# Patient Record
Sex: FEMALE | Race: WHITE | NOT HISPANIC OR LATINO | Employment: STUDENT | URBAN - METROPOLITAN AREA
[De-identification: names, ages, dates, MRNs, and addresses within clinical notes are randomized per-mention and may not be internally consistent; named-entity substitution may affect disease eponyms.]

---
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
## 2022-03-03 RX ORDER — LORATADINE 10 MG/1
1 CAPSULE, LIQUID FILLED ORAL DAILY
COMMUNITY

## 2022-03-03 RX ORDER — DOXYCYCLINE HYCLATE 100 MG/1
1 CAPSULE ORAL 2 TIMES DAILY
COMMUNITY
End: 2022-03-04

## 2022-03-03 RX ORDER — LEVOTHYROXINE SODIUM 0.05 MG/1
1 TABLET ORAL DAILY
COMMUNITY
End: 2022-03-04

## 2022-03-04 ENCOUNTER — ANNUAL EXAM (OUTPATIENT)
Dept: OBGYN CLINIC | Facility: CLINIC | Age: 22
End: 2022-03-04
Payer: COMMERCIAL

## 2022-03-04 VITALS
SYSTOLIC BLOOD PRESSURE: 100 MMHG | DIASTOLIC BLOOD PRESSURE: 60 MMHG | HEIGHT: 67 IN | BODY MASS INDEX: 19.62 KG/M2 | WEIGHT: 125 LBS

## 2022-03-04 DIAGNOSIS — Z01.419 ENCOUNTER FOR GYNECOLOGICAL EXAMINATION WITHOUT ABNORMAL FINDING: Primary | ICD-10-CM

## 2022-03-04 DIAGNOSIS — Z30.41 SURVEILLANCE FOR BIRTH CONTROL, ORAL CONTRACEPTIVES: ICD-10-CM

## 2022-03-04 PROCEDURE — S0612 ANNUAL GYNECOLOGICAL EXAMINA: HCPCS | Performed by: OBSTETRICS & GYNECOLOGY

## 2022-03-04 RX ORDER — DROSPIRENONE AND ETHINYL ESTRADIOL 0.02-3(28)
1 KIT ORAL DAILY
Qty: 90 TABLET | Refills: 4 | Status: SHIPPED | OUTPATIENT
Start: 2022-03-04 | End: 2023-03-04

## 2022-03-04 RX ORDER — LEVOTHYROXINE SODIUM 0.12 MG/1
0.5 TABLET ORAL DAILY
COMMUNITY
Start: 2022-03-02

## 2022-03-04 RX ORDER — SERTRALINE HYDROCHLORIDE 25 MG/1
1 TABLET, FILM COATED ORAL DAILY
COMMUNITY
Start: 2022-03-02

## 2022-03-04 NOTE — PATIENT INSTRUCTIONS
Pap every 3 years if normal, STI testing as indicated, exercise most days of week, obtain appropriate diet and hydration, Calcium 1000mg + 600 vit D daily, birth control as directed (ACHES reviewed)  Benefits, risks and alternatives discussed/reviewed  Condom use when sexually active for sexually transmitted infection prevention  HPV 9 vaccine recommended through age 39  Check with your insurance for coverage  If covered, call office to schedule start of vaccine series  Annual mammogram starting at age 36, monthly breast self exam  Tisha Venegas   at red light or at least  20 times a day

## 2022-03-04 NOTE — PROGRESS NOTES
[address], [address], [facility], [address] [facility]    ASSESSMENT/PLAN: Myla Durham is a 25 y o  Francois Parsons who presents for annual gynecologic exam     Encounter for routine gynecologic examination  - Routine well woman exam completed today  - HPV Vaccination status: Immunization series complete   -COVID maderna  X 3  Flu shot   Neg    - STI screening offered including HIV testing:  Na    - Contraceptive counseling discussed  Current contraception: condoms or combination OCPs:     Additional problems addressed during this visit:  1  Encounter for gynecological examination without abnormal finding    2  Surveillance for birth control, oral contraceptives  -     drospirenone-ethinyl estradiol (ROXANN) 3-0 02 MG per tablet; Take 1 tablet by mouth daily      24-year-old  0 para 0 here for wellness exam   Never sexually active  Positive on birth control pills for cycle regulation  Denies aches and breakthrough bleeding  Encouraged condom use if sexually active  CC:  Annual Gynecologic Examination    HPI: Myla Durham is a 25 y o  Francois Parsons who presents for annual gynecologic examination  24-year-old  0 para 0 here for wellness exam   Menarche at 17years of age cycles every 29-35 days, irregular but predictable, mild-to-moderate cramps changes a tampon every 4 hours Gardasil complete  Past medical history headaches no aura, hypothyroidism, and seasonal allergies  Surgical history hernia repair in  and appendectomy in   No family history of breast uterine ovarian or colon cancer  Nonsmoker on yes encouraged condoms if sexually active      The following portions of the patient's history were reviewed and updated as appropriate: She  has a past medical history of Hypothyroid  She  has a past surgical history that includes Hernia repair and Appendectomy  Her family history includes Migraines in her mother  She  reports that she has never smoked   She has never used smokeless tobacco  She reports current alcohol use  She reports that she does not use drugs  Current Outpatient Medications   Medication Sig Dispense Refill    drospirenone-ethinyl estradiol (ROXANN) 3-0 02 MG per tablet Take 1 tablet by mouth daily 90 tablet 4    levothyroxine 125 mcg tablet Take 0 5 tablets by mouth daily      Loratadine 10 MG CAPS Take 1 capsule by mouth daily      sertraline (ZOLOFT) 25 mg tablet Take 1 tablet by mouth daily       No current facility-administered medications for this visit  She has No Known Allergies       Review of Systems   Constitutional: Negative for chills and fever  HENT: Negative for ear pain and sore throat  Eyes: Negative for pain and visual disturbance  Respiratory: Negative for cough and shortness of breath  Cardiovascular: Negative for chest pain and palpitations  Gastrointestinal: Negative for abdominal pain and vomiting  Genitourinary: Negative for dysuria and hematuria  Musculoskeletal: Negative for arthralgias and back pain  Skin: Negative for color change and rash  Neurological: Negative for seizures and syncope  Hematological: Negative  Psychiatric/Behavioral: Negative  All other systems reviewed and are negative  Objective:  /60 (BP Location: Left arm, Patient Position: Sitting, Cuff Size: Standard)   Ht 5' 7 25" (1 708 m)   Wt 56 7 kg (125 lb)   LMP 02/22/2022 (Exact Date)   Breastfeeding No   BMI 19 43 kg/m²    Physical Exam  Vitals and nursing note reviewed  Constitutional:       Appearance: Normal appearance  HENT:      Head: Normocephalic  Cardiovascular:      Rate and Rhythm: Normal rate and regular rhythm  Pulses: Normal pulses  Heart sounds: Normal heart sounds  Pulmonary:      Effort: Pulmonary effort is normal       Breath sounds: Normal breath sounds  Chest:      Chest wall: No mass, lacerations, swelling, tenderness or edema  Breasts: Brian Score is 4   Breasts are symmetrical       Right: Normal  No swelling, bleeding, inverted nipple, mass, nipple discharge, skin change, tenderness, axillary adenopathy or supraclavicular adenopathy  Left: No swelling, bleeding, inverted nipple, mass, nipple discharge, skin change, tenderness, axillary adenopathy or supraclavicular adenopathy  Abdominal:      General: Abdomen is flat  Bowel sounds are normal       Palpations: Abdomen is soft  Genitourinary:     Brian stage (genital): 5  Comments: Pelvic deferred  Never sexually active   No vaginal complaints     Musculoskeletal:         General: Normal range of motion  Cervical back: Neck supple  Lymphadenopathy:      Upper Body:      Right upper body: No supraclavicular, axillary or pectoral adenopathy  Left upper body: No supraclavicular, axillary or pectoral adenopathy  Skin:     General: Skin is warm and dry  Neurological:      General: No focal deficit present  Mental Status: She is alert and oriented to person, place, and time     Psychiatric:         Mood and Affect: Mood normal          Behavior: Behavior normal

## 2022-04-29 ENCOUNTER — TELEPHONE (OUTPATIENT)
Dept: OBGYN CLINIC | Facility: CLINIC | Age: 22
End: 2022-04-29

## 2022-04-29 NOTE — TELEPHONE ENCOUNTER
Pt called informing she has been on the same OCP for 2 years and is having BTB concerns  Pt denies missing any pills but admits to not talking her pills consistently same time everyday, pt states, " I need to get better at that "  Pt is sexually active and reports she uses back up protection  Advised some birth controls are time sensitive and should be taken with in an hour of a scheduled time daily, not taking consistently daily can cause BTB, continue to monitor, if BTB continues after taking daily at the same time, [place] BTB days on pill pack and schedule an OV and  check a HPT  Pt reports she just had a normal period last week     Pt also c/o vaginal itching, no odor,admits to treating for yeast infections in the last "month or so"  but not finishing full 7 day course, states, " I use Monistat hear and there "  Recommended to complete a full 7 day course of Monistat, open to air, allow pelvic rest, if symptoms persist, develops abnormal vaginal discharge or odor, schedule a pelvic exam

## 2023-03-16 DIAGNOSIS — Z30.41 SURVEILLANCE FOR BIRTH CONTROL, ORAL CONTRACEPTIVES: ICD-10-CM

## 2023-03-16 RX ORDER — DROSPIRENONE AND ETHINYL ESTRADIOL 0.02-3(28)
KIT ORAL
Qty: 84 TABLET | OUTPATIENT
Start: 2023-03-16

## 2023-03-16 RX ORDER — DROSPIRENONE AND ETHINYL ESTRADIOL 0.02-3(28)
1 KIT ORAL DAILY
Qty: 90 TABLET | Refills: 0 | Status: SHIPPED | OUTPATIENT
Start: 2023-03-16 | End: 2023-03-16 | Stop reason: SDUPTHER

## 2023-03-16 RX ORDER — DROSPIRENONE AND ETHINYL ESTRADIOL 0.02-3(28)
1 KIT ORAL DAILY
Qty: 90 TABLET | Refills: 0 | Status: SHIPPED | OUTPATIENT
Start: 2023-03-16 | End: 2024-03-15

## 2023-03-30 ENCOUNTER — PATIENT MESSAGE (OUTPATIENT)
Dept: OBGYN CLINIC | Facility: CLINIC | Age: 23
End: 2023-03-30

## 2023-03-30 DIAGNOSIS — Z30.41 SURVEILLANCE FOR BIRTH CONTROL, ORAL CONTRACEPTIVES: Primary | ICD-10-CM

## 2023-03-30 RX ORDER — NORGESTREL-ETHINYL ESTRADIOL 0.3-0.03MG
1 TABLET ORAL DAILY
Qty: 90 TABLET | Refills: 2 | Status: SHIPPED | OUTPATIENT
Start: 2023-03-30 | End: 2023-09-26

## 2023-08-15 ENCOUNTER — ANNUAL EXAM (OUTPATIENT)
Dept: OBGYN CLINIC | Facility: CLINIC | Age: 23
End: 2023-08-15
Payer: COMMERCIAL

## 2023-08-15 VITALS
WEIGHT: 143.2 LBS | DIASTOLIC BLOOD PRESSURE: 74 MMHG | HEIGHT: 68 IN | SYSTOLIC BLOOD PRESSURE: 110 MMHG | BODY MASS INDEX: 21.7 KG/M2

## 2023-08-15 DIAGNOSIS — Z30.41 SURVEILLANCE FOR BIRTH CONTROL, ORAL CONTRACEPTIVES: ICD-10-CM

## 2023-08-15 DIAGNOSIS — Z01.419 GYNECOLOGIC EXAM NORMAL: Primary | ICD-10-CM

## 2023-08-15 DIAGNOSIS — R10.2 PELVIC PAIN: ICD-10-CM

## 2023-08-15 PROBLEM — E06.3 AUTOIMMUNE HYPOTHYROIDISM: Status: ACTIVE | Noted: 2018-09-10

## 2023-08-15 PROBLEM — E06.3 HASHIMOTO'S THYROIDITIS: Status: ACTIVE | Noted: 2018-09-10

## 2023-08-15 PROCEDURE — G0145 SCR C/V CYTO,THINLAYER,RESCR: HCPCS | Performed by: PHYSICIAN ASSISTANT

## 2023-08-15 PROCEDURE — S0612 ANNUAL GYNECOLOGICAL EXAMINA: HCPCS | Performed by: PHYSICIAN ASSISTANT

## 2023-08-15 RX ORDER — NORGESTREL-ETHINYL ESTRADIOL 0.3-0.03MG
1 TABLET ORAL DAILY
Qty: 84 TABLET | Refills: 4 | Status: SHIPPED | OUTPATIENT
Start: 2023-08-15 | End: 2024-02-11

## 2023-08-15 RX ORDER — LEVOTHYROXINE SODIUM 0.07 MG/1
TABLET ORAL
COMMUNITY
Start: 2022-10-01

## 2023-08-15 NOTE — PROGRESS NOTES
Assessment/Plan   Problem List Items Addressed This Visit        Other    Gynecologic exam normal - Primary     Pap guidelines reviewed. Pap with reflex done today. With new onset pelvic pain outside of menses in the last few months recommend pelvic ultrasound to further evaluate. Office will call with results and appropriate follow up. Reviewed options for heavy, painful bleeding. Reviewed option of switching back to Mis vs Melany to see if helps. Patient would like to continue Cryselle for now and will decide further once pelvic ultrasound results come back. Return to office for annual or as needed. Relevant Orders    Liquid-based pap, screening   Other Visit Diagnoses     Pelvic pain        Relevant Orders    US pelvis complete w transvaginal    Surveillance for birth control, oral contraceptives        Relevant Medications    norgestrel-ethinyl estradiol (Cryselle-28) 0.3 mg-30 mcg per tablet          Subjective:     Patient ID: Oxana Hawkins is a 26 y.o. y.o. female. HPI  27 yo seen for annual exam. Previously on Mis OCP, was having a lot of breakthrough bleeding. Switched to Cryselle OCP in 3/2023. Reports cramping in lower back prior to menses. Also reports menses have been heavier. Reports is having pain randomly not only with menses. Noticing more recently on left side but does occur on right. Denies bowel or bladder issues. Currently uses pads or tampons. Changing super tampon every 3 hours on the heaviest day. Heavy for the first 4 days or so. Denies bowel or bladder issues. Denies STD concerns. Last pap: has not had done yet. The following portions of the patient's history were reviewed and updated as appropriate:   She  has a past medical history of Anemia, Hypothyroid, and Migraine.   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 Her family history includes Migraines in her mother. She  reports that she has never smoked. She has never used smokeless tobacco. She reports current alcohol use. She reports that she does not use drugs. Current Outpatient Medications   Medication Sig Dispense Refill   • levothyroxine 75 mcg tablet      • Loratadine 10 MG CAPS Take 1 capsule by mouth daily     • norgestrel-ethinyl estradiol (Cryselle-28) 0.3 mg-30 mcg per tablet Take 1 tablet by mouth daily 84 tablet 4   • sertraline (ZOLOFT) 25 mg tablet Take 1 tablet by mouth daily       No current facility-administered medications for this visit. She has No Known Allergies. .    Menstrual History:  OB History        0    Para   0    Term   0       0    AB   0    Living   0       SAB   0    IAB   0    Ectopic   0    Multiple   0    Live Births   0           Obstetric Comments   Menarche: 14                Patient's last menstrual period was 2023 (approximate). Review of Systems   Constitutional: Negative for fatigue, fever and unexpected weight change. HENT: Negative for dental problem and sinus pressure. Eyes: Negative for visual disturbance. Respiratory: Negative for cough, shortness of breath and wheezing. Cardiovascular: Negative for chest pain. Gastrointestinal: Negative for abdominal pain, blood in stool, constipation, diarrhea, nausea and vomiting. Endocrine: Negative for polydipsia. Genitourinary: Positive for pelvic pain. Negative for difficulty urinating, dyspareunia, dysuria, frequency, hematuria and urgency. Musculoskeletal: Negative for arthralgias and back pain. Neurological: Negative for dizziness, seizures, light-headedness and headaches. Psychiatric/Behavioral: Negative for suicidal ideas.  The patient is not nervous/anxious. Objective:  Vitals:    08/15/23 1112   BP: 110/74   BP Location: Left arm   Patient Position: Sitting   Cuff Size: Standard   Weight: 65 kg (143 lb 3.2 oz)   Height: 5' 8" (1.727 m)      Physical Exam  Constitutional:       Appearance: Normal appearance. She is well-developed. Genitourinary:      Vulva and bladder normal.      No lesions in the vagina. Right Labia: No rash, tenderness, lesions or skin changes. Left Labia: No tenderness, lesions, skin changes or rash. No labial fusion noted. No inguinal adenopathy present in the right or left side. No vaginal discharge, erythema, tenderness or bleeding. Right Adnexa: not tender, not full and no mass present. Left Adnexa: not tender, not full and no mass present. No cervical motion tenderness, discharge or lesion. Uterus is not enlarged, tender or irregular. No uterine mass detected. No urethral prolapse, tenderness or mass present. Bladder is not tender. Breasts:     Breasts are symmetrical.      Right: No swelling, bleeding, inverted nipple, mass, nipple discharge, skin change or tenderness. Left: No swelling, bleeding, inverted nipple, mass, nipple discharge, skin change or tenderness. HENT:      Head: Normocephalic and atraumatic. Neck:      Thyroid: No thyromegaly. Cardiovascular:      Rate and Rhythm: Normal rate and regular rhythm. Heart sounds: Normal heart sounds. No murmur heard. No friction rub. No gallop. Pulmonary:      Effort: Pulmonary effort is normal. No respiratory distress. Breath sounds: Normal breath sounds. No wheezing. Abdominal:      General: There is no distension. Palpations: Abdomen is soft. There is no mass. Tenderness: There is no abdominal tenderness. There is no guarding or rebound. Hernia: No hernia is present. Lymphadenopathy:      Cervical: No cervical adenopathy.       Upper Body:      Right upper body: No supraclavicular, axillary or pectoral adenopathy. Left upper body: No supraclavicular, axillary or pectoral adenopathy. Lower Body: No right inguinal adenopathy. No left inguinal adenopathy. Neurological:      Mental Status: She is alert and oriented to person, place, and time. Skin:     General: Skin is warm and dry.    Psychiatric:         Behavior: Behavior normal.

## 2023-08-15 NOTE — ASSESSMENT & PLAN NOTE
Pap guidelines reviewed. Pap with reflex done today. With new onset pelvic pain outside of menses in the last few months recommend pelvic ultrasound to further evaluate. Office will call with results and appropriate follow up. Reviewed options for heavy, painful bleeding. Reviewed option of switching back to Mis vs Melany to see if helps. Patient would like to continue Cryselle for now and will decide further once pelvic ultrasound results come back. Return to office for annual or as needed.

## 2023-08-22 LAB
LAB AP GYN PRIMARY INTERPRETATION: NORMAL
LAB AP LMP: NORMAL
Lab: NORMAL

## 2023-10-14 PROBLEM — Z01.419 GYNECOLOGIC EXAM NORMAL: Status: RESOLVED | Noted: 2023-08-15 | Resolved: 2023-10-14

## 2024-02-29 DIAGNOSIS — L70.0 ACNE VULGARIS: Primary | ICD-10-CM

## 2024-02-29 RX ORDER — DROSPIRENONE AND ETHINYL ESTRADIOL 0.02-3(28)
1 KIT ORAL DAILY
Qty: 84 TABLET | Refills: 2 | Status: SHIPPED | OUTPATIENT
Start: 2024-02-29

## 2024-07-30 ENCOUNTER — PATIENT MESSAGE (OUTPATIENT)
Dept: OBGYN CLINIC | Facility: CLINIC | Age: 24
End: 2024-07-30

## 2024-07-30 DIAGNOSIS — L70.0 ACNE VULGARIS: ICD-10-CM

## 2024-07-31 RX ORDER — DROSPIRENONE AND ETHINYL ESTRADIOL 0.02-3(28)
1 KIT ORAL DAILY
Qty: 84 TABLET | Refills: 0 | Status: SHIPPED | OUTPATIENT
Start: 2024-07-31

## 2024-08-23 ENCOUNTER — ANNUAL EXAM (OUTPATIENT)
Dept: OBGYN CLINIC | Facility: CLINIC | Age: 24
End: 2024-08-23
Payer: COMMERCIAL

## 2024-08-23 VITALS
SYSTOLIC BLOOD PRESSURE: 108 MMHG | DIASTOLIC BLOOD PRESSURE: 72 MMHG | BODY MASS INDEX: 21.07 KG/M2 | HEIGHT: 68 IN | WEIGHT: 139 LBS

## 2024-08-23 DIAGNOSIS — E06.3 AUTOIMMUNE HYPOTHYROIDISM: ICD-10-CM

## 2024-08-23 DIAGNOSIS — Z11.3 SCREEN FOR STD (SEXUALLY TRANSMITTED DISEASE): ICD-10-CM

## 2024-08-23 DIAGNOSIS — Z01.419 ENCOUNTER FOR GYNECOLOGICAL EXAMINATION WITHOUT ABNORMAL FINDING: Primary | ICD-10-CM

## 2024-08-23 DIAGNOSIS — Z30.41 SURVEILLANCE FOR BIRTH CONTROL, ORAL CONTRACEPTIVES: ICD-10-CM

## 2024-08-23 DIAGNOSIS — L70.0 ACNE VULGARIS: ICD-10-CM

## 2024-08-23 PROBLEM — Z12.4 SCREENING FOR CERVICAL CANCER: Status: ACTIVE | Noted: 2024-08-23

## 2024-08-23 PROCEDURE — S0612 ANNUAL GYNECOLOGICAL EXAMINA: HCPCS | Performed by: OBSTETRICS & GYNECOLOGY

## 2024-08-23 RX ORDER — DROSPIRENONE AND ETHINYL ESTRADIOL 0.02-3(28)
1 KIT ORAL DAILY
Qty: 84 TABLET | Refills: 4 | Status: SHIPPED | OUTPATIENT
Start: 2024-08-23

## 2024-08-23 RX ORDER — FAMOTIDINE 10 MG
10 TABLET ORAL DAILY
COMMUNITY

## 2024-08-23 NOTE — PROGRESS NOTES
[facility] - [address], [address], [place] [address]    ASSESSMENT/PLAN: Kenia Johnson is a 24 y.o.  who presents for annual gynecologic exam.    Encounter for routine gynecologic examination  - Routine well woman exam completed today.  - HPV Vaccination status: Immunization series complete  - STI screening offered including HIV testing: Declined  - Contraceptive counseling discussed.  Current contraception: condoms or combination OCPs:     Additional problems addressed during this visit:  1. Encounter for gynecological examination without abnormal finding  2. Acne vulgaris  -     drospirenone-ethinyl estradiol (ROXANN) 3-0.02 MG per tablet; Take 1 tablet by mouth daily  3. Autoimmune hypothyroidism  4. Screen for STD (sexually transmitted disease)  -     Chlamydia/GC HARMONY, Confirmation  5. Surveillance for birth control, oral contraceptives      CC:  Annual Gynecologic Examination    HPI: Kenia Johnson is a 24 y.o.  who presents for annual gynecologic examination.  HPI    The following portions of the patient's history were reviewed and updated as appropriate: She  has a past medical history of Anemia, Hypothyroid, and Migraine.  She  has a past surgical history that includes Hernia repair; Appendectomy; and BOTOX INJECTION.  Her family history includes Migraines in her mother.  She  reports that she has never smoked. She has never used smokeless tobacco. She reports current alcohol use. She reports that she does not use drugs.  Current Outpatient Medications   Medication Sig Dispense Refill   • drospirenone-ethinyl estradiol (ROXANN) 3-0.02 MG per tablet Take 1 tablet by mouth daily 84 tablet 4   • famotidine (PEPCID) 10 mg tablet Take 10 mg by mouth in the morning     • levothyroxine 75 mcg tablet      • sertraline (ZOLOFT) 25 mg tablet Take 1 tablet by mouth daily       No current facility-administered medications for this visit.     She has No Known Allergies..    Review of  "Systems   Constitutional:  Negative for chills and fever.   HENT:  Negative for ear pain and sore throat.    Eyes: Negative.  Negative for pain and visual disturbance.   Respiratory:  Negative for cough and shortness of breath.    Cardiovascular:  Negative for chest pain and palpitations.   Gastrointestinal:  Negative for abdominal pain and vomiting.   Endocrine: Negative.    Genitourinary: Negative.  Negative for dysuria and hematuria.   Musculoskeletal:  Negative for arthralgias and back pain.   Skin:  Negative for color change and rash.   Allergic/Immunologic: Negative.    Neurological: Negative.  Negative for seizures and syncope.   Hematological: Negative.    Psychiatric/Behavioral: Negative.     All other systems reviewed and are negative.        Objective:  /72 (BP Location: Left arm, Patient Position: Sitting, Cuff Size: Large)   Ht 5' 8\" (1.727 m)   Wt 63 kg (139 lb)   LMP 08/13/2024 (Approximate)   BMI 21.13 kg/m²    Physical Exam  Vitals and nursing note reviewed.   Constitutional:       Appearance: Normal appearance.   HENT:      Head: Normocephalic.   Cardiovascular:      Rate and Rhythm: Normal rate and regular rhythm.      Pulses: Normal pulses.      Heart sounds: Normal heart sounds.   Pulmonary:      Effort: Pulmonary effort is normal.      Breath sounds: Normal breath sounds.   Chest:      Chest wall: No mass, lacerations, swelling, tenderness or edema.   Breasts:     Brian Score is 4.      Breasts are symmetrical.      Right: Normal. No swelling, bleeding, inverted nipple, mass, nipple discharge, skin change or tenderness.      Left: No swelling, bleeding, inverted nipple, mass, nipple discharge, skin change or tenderness.   Abdominal:      General: Abdomen is flat. Bowel sounds are normal.      Palpations: Abdomen is soft.   Genitourinary:     General: Normal vulva.      Exam position: Lithotomy position.      Pubic Area: No rash.       Brian stage (genital): 4.      Labia:         " Right: No rash, tenderness or lesion.         Left: No rash, tenderness or lesion.       Urethra: No urethral pain, urethral swelling or urethral lesion.      Vagina: Normal.      Cervix: No cervical motion tenderness or discharge.      Uterus: Normal.       Adnexa: Right adnexa normal and left adnexa normal.      Rectum: Normal.   Musculoskeletal:         General: Normal range of motion.      Cervical back: Normal range of motion and neck supple.   Lymphadenopathy:      Upper Body:      Right upper body: No supraclavicular, axillary or pectoral adenopathy.      Left upper body: No supraclavicular, axillary or pectoral adenopathy.      Lower Body: No right inguinal adenopathy. No left inguinal adenopathy.   Skin:     General: Skin is warm and dry.   Neurological:      General: No focal deficit present.      Mental Status: She is alert and oriented to person, place, and time.   Psychiatric:         Mood and Affect: Mood normal.         Behavior: Behavior normal.         Thought Content: Thought content normal.         Judgment: Judgment normal.

## 2024-09-22 PROBLEM — Z12.4 SCREENING FOR CERVICAL CANCER: Status: RESOLVED | Noted: 2024-08-23 | Resolved: 2024-09-22

## 2024-09-22 PROBLEM — Z11.3 SCREEN FOR STD (SEXUALLY TRANSMITTED DISEASE): Status: RESOLVED | Noted: 2024-08-23 | Resolved: 2024-09-22

## 2024-09-22 PROBLEM — Z01.419 ENCOUNTER FOR GYNECOLOGICAL EXAMINATION WITHOUT ABNORMAL FINDING: Status: RESOLVED | Noted: 2024-08-23 | Resolved: 2024-09-22

## 2024-09-27 ENCOUNTER — NURSE TRIAGE (OUTPATIENT)
Age: 24
End: 2024-09-27

## 2024-09-27 NOTE — TELEPHONE ENCOUNTER
"Pt called in with concerns for breakthrough bleeding on her OCP. States she has been on her current OCP (Haley) for a little over a year. She takes it at the same time every day and has not missed a dose. LMP was about 3 weeks ago. Started bleeding again this past Monday, states it is heavier than her usual periods. She is wearing tampons throughout the day and a panty liner at bedtime. States the panty liner was completely saturated when she woke up this morning. Changing her tampon every 6 hours throughout the day. Passing clots when using the bathroom, dime sized. Having mild intermittent cramping. Advised pt that one irregular cycle can be normal but she should call back if this occurs again next month, her bleeding becomes heavier, she passes large clots, has severe pain or other questions/concerns. She verbalized understanding and is thankful.      Reason for Disposition   Irregular or unexpected bleeding or spotting    Answer Assessment - Initial Assessment Questions  1. TYPE: \"What is the name of the birth control pill you are using?\"      haley  2. PACK TYPE: \"How do you take your birth control pill?\"    - 28-Day Cycle/Pack: Takes an active hormone pill days 1-21 and placebo pill on days 21-28    - 28-Day Cycle/21-Day Pack: Takes an active hormone pill days 1-21, followed by a pill-free week    - 3-Month Cycle/Pack: Takes an active pill for 3 months, followed by pill free week or placebo week    - Continuous: Takes an active hormone pill every day, continuously      Has placebo week  3. START DATE: \"When did you first start taking this birth control pill?\"      About a year ago  4. SYMPTOM: \"What is the main symptom (or question) you're concerned about?\"      Breakthrough bleeding  5. ONSET: \"When did the bleeding start?\"      monday  6. VAGINAL BLEEDING: \"Are you having any unusual vaginal bleeding?\"    - NONE: no bleeding    - SPOTTING: spotting or pinkish / brownish mucous discharge; does not fill " "panty-liner or pad    - MILD: less than 1 pad / hour; less than patient's usual menstrual bleeding    - MODERATE: 1-2 pads / hour; small-medium blood clots (e.g., pea, grape, small coin)    - SEVERE: soaking 2 or more pads/hour for 2 or more hours; bleeding not contained by pads or   tampons      mild  7. ABDOMEN OR PELVIC PAIN: \"Are you have any pain in your abdomen or pelvic area?\" (Scale: 0, 1-10; none, mild, moderate, severe)    - NONE (0): no pain    - MILD (1-3): doesn't interfere with normal activities, abdomen soft and not tender to touch     - MODERATE (4-7): interferes with normal activities or awakens from sleep, tender to touch     - SEVERE (8-10): excruciating pain, doubled over, unable to do any normal activities        Intermittent cramping  8. MISSED/LATE PILLS: \"Did you miss or take any pills late?\" If Yes, ask: \"When? How many pills?\"    - NOTE: Pill is considered late/missed if taken more than 3 hours later than scheduled time.      denies  9. PREGNANCY: \"Are you concerned you might be pregnant?\" \"When was your last menstrual   period?\"      LMP about 3 weeks ago    Protocols used: Contraception - Birth Control Pills - Combined-ADULT-[place]    "

## 2025-03-24 DIAGNOSIS — L70.0 ACNE VULGARIS: ICD-10-CM

## 2025-03-24 RX ORDER — DROSPIRENONE AND ETHINYL ESTRADIOL 0.02-3(28)
1 KIT ORAL DAILY
Qty: 84 TABLET | Refills: 1 | Status: SHIPPED | OUTPATIENT
Start: 2025-03-24

## 2025-07-22 DIAGNOSIS — L70.0 ACNE VULGARIS: ICD-10-CM

## 2025-07-24 RX ORDER — DROSPIRENONE AND ETHINYL ESTRADIOL 0.02-3(28)
1 KIT ORAL DAILY
Qty: 84 TABLET | Refills: 0 | Status: SHIPPED | OUTPATIENT
Start: 2025-07-24

## 2025-07-25 ENCOUNTER — TELEPHONE (OUTPATIENT)
Age: 25
End: 2025-07-25

## 2025-08-01 ENCOUNTER — OFFICE VISIT (OUTPATIENT)
Dept: GASTROENTEROLOGY | Facility: MEDICAL CENTER | Age: 25
End: 2025-08-01

## 2025-08-01 ENCOUNTER — APPOINTMENT (OUTPATIENT)
Dept: LAB | Facility: MEDICAL CENTER | Age: 25
End: 2025-08-01
Payer: COMMERCIAL

## 2025-08-01 VITALS
DIASTOLIC BLOOD PRESSURE: 82 MMHG | TEMPERATURE: 98.4 F | OXYGEN SATURATION: 98 % | SYSTOLIC BLOOD PRESSURE: 123 MMHG | WEIGHT: 142.1 LBS | HEART RATE: 88 BPM | BODY MASS INDEX: 21.54 KG/M2 | HEIGHT: 68 IN

## 2025-08-01 DIAGNOSIS — K58.2 IRRITABLE BOWEL SYNDROME WITH BOTH CONSTIPATION AND DIARRHEA: Primary | ICD-10-CM

## 2025-08-01 DIAGNOSIS — R16.0 HEPATOMEGALY: ICD-10-CM

## 2025-08-01 DIAGNOSIS — R19.8 IRREGULAR BOWEL HABITS: ICD-10-CM

## 2025-08-01 DIAGNOSIS — R14.0 BLOATING: ICD-10-CM

## 2025-08-01 LAB — CRP SERPL QL: 2.8 MG/L

## 2025-08-01 PROCEDURE — 36415 COLL VENOUS BLD VENIPUNCTURE: CPT

## 2025-08-01 PROCEDURE — 86140 C-REACTIVE PROTEIN: CPT

## 2025-08-01 RX ORDER — B.COAGUL,SUBTILIS/INULIN/VIT C 1B CELL-1G
TABLET,CHEWABLE ORAL
COMMUNITY

## 2025-08-02 ENCOUNTER — APPOINTMENT (OUTPATIENT)
Dept: LAB | Facility: HOSPITAL | Age: 25
End: 2025-08-02
Payer: COMMERCIAL

## 2025-08-02 DIAGNOSIS — R19.8 IRREGULAR BOWEL HABITS: ICD-10-CM

## 2025-08-02 PROCEDURE — 83993 ASSAY FOR CALPROTECTIN FECAL: CPT

## 2025-08-02 PROCEDURE — 82653 EL-1 FECAL QUANTITATIVE: CPT

## 2025-08-04 LAB
CALPROTECTIN STL-MCNC: <5 ÂΜG/G
ELASTASE PANC STL-MCNT: >800 UG/G

## 2025-08-11 ENCOUNTER — ANNUAL EXAM (OUTPATIENT)
Dept: OBGYN CLINIC | Facility: CLINIC | Age: 25
End: 2025-08-11
Payer: COMMERCIAL

## 2025-08-12 ENCOUNTER — APPOINTMENT (OUTPATIENT)
Age: 25
End: 2025-08-12
Payer: COMMERCIAL

## 2025-08-13 ENCOUNTER — RESULTS FOLLOW-UP (OUTPATIENT)
Age: 25
End: 2025-08-13

## 2025-08-13 ENCOUNTER — TELEPHONE (OUTPATIENT)
Dept: GASTROENTEROLOGY | Facility: MEDICAL CENTER | Age: 25
End: 2025-08-13

## 2025-08-19 DIAGNOSIS — E06.3 AUTOIMMUNE HYPOTHYROIDISM: ICD-10-CM

## 2025-08-19 DIAGNOSIS — E06.3 HASHIMOTO'S THYROIDITIS: Primary | ICD-10-CM

## 2025-08-19 DIAGNOSIS — F33.9 EPISODE OF RECURRENT MAJOR DEPRESSIVE DISORDER, UNSPECIFIED DEPRESSION EPISODE SEVERITY (HCC): ICD-10-CM

## 2025-08-19 RX ORDER — LEVOTHYROXINE SODIUM 88 UG/1
88 TABLET ORAL DAILY
Qty: 90 TABLET | Refills: 1 | Status: SHIPPED | OUTPATIENT
Start: 2025-08-19

## 2025-08-19 RX ORDER — SERTRALINE HYDROCHLORIDE 25 MG/1
25 TABLET, FILM COATED ORAL DAILY
Qty: 90 TABLET | Refills: 1 | Status: SHIPPED | OUTPATIENT
Start: 2025-08-19